# Patient Record
Sex: MALE | Race: WHITE | NOT HISPANIC OR LATINO | Employment: STUDENT | URBAN - METROPOLITAN AREA
[De-identification: names, ages, dates, MRNs, and addresses within clinical notes are randomized per-mention and may not be internally consistent; named-entity substitution may affect disease eponyms.]

---
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
## 2024-06-28 ENCOUNTER — OFFICE VISIT (OUTPATIENT)
Dept: URGENT CARE | Facility: CLINIC | Age: 24
End: 2024-06-28
Payer: COMMERCIAL

## 2024-06-28 VITALS
TEMPERATURE: 98 F | SYSTOLIC BLOOD PRESSURE: 115 MMHG | DIASTOLIC BLOOD PRESSURE: 70 MMHG | BODY MASS INDEX: 19.99 KG/M2 | WEIGHT: 120 LBS | RESPIRATION RATE: 17 BRPM | OXYGEN SATURATION: 98 % | HEIGHT: 65 IN | HEART RATE: 82 BPM

## 2024-06-28 DIAGNOSIS — J02.9 SORE THROAT: Primary | ICD-10-CM

## 2024-06-28 LAB
CTP QC/QA: YES
MOLECULAR STREP A: NEGATIVE

## 2024-06-28 NOTE — PROGRESS NOTES
"Subjective:      Patient ID: Ernesto Navarro is a 23 y.o. male.    Vitals:  height is 5' 5" (1.651 m) and weight is 54.4 kg (120 lb). His oral temperature is 98.4 °F (36.9 °C). His blood pressure is 115/70 and his pulse is 82. His respiration is 17 and oxygen saturation is 98%.     Chief Complaint: Sore Throat    Patient presents with sore throat and painful swallowing . He states he has been having throat pain for 2 days . Patient denies any other related symptoms. Patient states he has been taking tylenol and dayquil with only mild relief.     Sore Throat   This is a new problem. Episode onset: 2 days ago. The problem has been unchanged. Neither side of throat is experiencing more pain than the other. There has been no fever. The pain is at a severity of 7/10. The pain is moderate. Associated symptoms include trouble swallowing. Pertinent negatives include no coughing, diarrhea or drooling. Treatments tried: tylenol. The treatment provided mild relief.       HENT:  Positive for sore throat and trouble swallowing. Negative for drooling.    Respiratory:  Negative for cough.    Gastrointestinal:  Negative for diarrhea.   Skin:  Negative for erythema.      Objective:     Physical Exam   Constitutional: He is oriented to person, place, and time. He appears well-developed. He is cooperative.  Non-toxic appearance. He does not appear ill. No distress.   HENT:   Head: Normocephalic and atraumatic.   Ears:   Right Ear: Hearing, tympanic membrane, external ear and ear canal normal.   Left Ear: Hearing, tympanic membrane, external ear and ear canal normal.   Nose: Nose normal. No mucosal edema, rhinorrhea or nasal deformity. No epistaxis. Right sinus exhibits no maxillary sinus tenderness and no frontal sinus tenderness. Left sinus exhibits no maxillary sinus tenderness and no frontal sinus tenderness.   Mouth/Throat: Uvula is midline and mucous membranes are normal. No trismus in the jaw. Normal dentition. No uvula swelling. " Posterior oropharyngeal erythema present. No oropharyngeal exudate or posterior oropharyngeal edema.   Eyes: Conjunctivae, EOM and lids are normal. Pupils are equal, round, and reactive to light. Right eye exhibits no discharge. Left eye exhibits no discharge. No scleral icterus.   Neck: Trachea normal and phonation normal. Neck supple. No JVD present. No tracheal deviation present. No thyromegaly present. No edema present. No erythema present. No neck rigidity present.   Cardiovascular: Normal rate, regular rhythm, normal heart sounds and normal pulses.   No murmur heard.Exam reveals no gallop and no friction rub.   Pulmonary/Chest: Effort normal and breath sounds normal. No stridor. No respiratory distress. He has no decreased breath sounds. He has no wheezes. He has no rhonchi. He has no rales. He exhibits no tenderness.   Abdominal: Normal appearance. He exhibits no distension. Soft. There is no abdominal tenderness. There is no rebound and no guarding.   Musculoskeletal: Normal range of motion.         General: No deformity. Normal range of motion.   Neurological: He is alert and oriented to person, place, and time. He exhibits normal muscle tone. Coordination normal.   Skin: Skin is warm, dry, intact, not diaphoretic, not pale and no rash. Capillary refill takes less than 2 seconds. No erythema   Psychiatric: His speech is normal and behavior is normal. Judgment and thought content normal.   Nursing note and vitals reviewed.    Results for orders placed or performed in visit on 06/28/24   POCT Strep A, Molecular   Result Value Ref Range    Molecular Strep A, POC Negative Negative     Acceptable Yes     No results found.     Assessment:     1. Sore throat        Plan:       Sore throat  -     POCT Strep A, Molecular      Follow up if symptoms worsen or fail to improve, for F/U with PCP or ED.   Patient Instructions   OVER-THE-COUNTER COUGH AND COLD MEDICATIONS